# Patient Record
Sex: MALE | Race: WHITE | NOT HISPANIC OR LATINO | Employment: OTHER | URBAN - NONMETROPOLITAN AREA
[De-identification: names, ages, dates, MRNs, and addresses within clinical notes are randomized per-mention and may not be internally consistent; named-entity substitution may affect disease eponyms.]

---

## 2017-04-19 ENCOUNTER — OFFICE VISIT (OUTPATIENT)
Dept: SURGERY | Facility: CLINIC | Age: 52
End: 2017-04-19

## 2017-04-19 VITALS
HEIGHT: 71 IN | HEART RATE: 89 BPM | WEIGHT: 275 LBS | OXYGEN SATURATION: 98 % | BODY MASS INDEX: 38.5 KG/M2 | DIASTOLIC BLOOD PRESSURE: 80 MMHG | SYSTOLIC BLOOD PRESSURE: 142 MMHG | TEMPERATURE: 97.5 F

## 2017-04-19 DIAGNOSIS — Z53.21 PATIENT LEFT WITHOUT BEING SEEN: Primary | ICD-10-CM

## 2017-04-19 RX ORDER — PRAVASTATIN SODIUM 80 MG/1
80 TABLET ORAL DAILY
COMMUNITY

## 2017-04-19 RX ORDER — CITALOPRAM 40 MG/1
40 TABLET ORAL DAILY
COMMUNITY

## 2017-04-19 RX ORDER — METFORMIN HYDROCHLORIDE 500 MG/1
500 TABLET, EXTENDED RELEASE ORAL
COMMUNITY

## 2017-04-19 RX ORDER — GEMFIBROZIL 600 MG/1
600 TABLET, FILM COATED ORAL
COMMUNITY

## 2017-04-19 RX ORDER — TAMSULOSIN HYDROCHLORIDE 0.4 MG/1
1 CAPSULE ORAL NIGHTLY
COMMUNITY

## 2017-04-19 RX ORDER — LISINOPRIL AND HYDROCHLOROTHIAZIDE 12.5; 1 MG/1; MG/1
1 TABLET ORAL DAILY
COMMUNITY

## 2017-04-19 RX ORDER — GABAPENTIN 800 MG/1
800 TABLET ORAL 3 TIMES DAILY
COMMUNITY

## 2017-04-19 RX ORDER — HYDROCODONE BITARTRATE AND ACETAMINOPHEN 10; 325 MG/1; MG/1
1 TABLET ORAL EVERY 6 HOURS PRN
COMMUNITY

## 2017-04-19 NOTE — PROGRESS NOTES
"Subjective   Antoine Hansen is a 51 y.o. male.   Chief Complaint   Patient presents with   • Colonoscopy     Patient has hx of hemorrhoids and ipoid induced constipation. Patient denies rectal bleeding. No proir colonoscopy. Family hx of colon cancer.        History of Present Illness     The patient left the office without being seen by a provider.      Review of Systems   Constitutional: Positive for fatigue. Unexpected weight change: GAIN.   HENT: Positive for hearing loss.    Eyes: Negative.    Respiratory: Positive for shortness of breath and wheezing.    Gastrointestinal: Positive for constipation.   Endocrine: Negative.    Genitourinary: Positive for frequency.   Musculoskeletal: Positive for joint swelling and myalgias.   Allergic/Immunologic: Negative.    Neurological: Positive for weakness and numbness.        CONFUSION     Hematological: Negative.    Psychiatric/Behavioral: Positive for dysphoric mood and sleep disturbance. The patient is nervous/anxious.        Objective    /80  Pulse 89  Temp 97.5 °F (36.4 °C)  Ht 71\" (180.3 cm)  Wt 275 lb (125 kg)  SpO2 98%  BMI 38.35 kg/m2    Physical Exam    Assessment/Plan   Antoine was seen today for colonoscopy.    Diagnoses and all orders for this visit:    Patient left without being seen               "